# Patient Record
Sex: FEMALE | Race: WHITE | NOT HISPANIC OR LATINO | Employment: OTHER | URBAN - METROPOLITAN AREA
[De-identification: names, ages, dates, MRNs, and addresses within clinical notes are randomized per-mention and may not be internally consistent; named-entity substitution may affect disease eponyms.]

---

## 2022-07-27 DIAGNOSIS — M47.896 OTHER OSTEOARTHRITIS OF SPINE, LUMBAR REGION: Primary | ICD-10-CM

## 2022-08-18 DIAGNOSIS — M81.0 OSTEOPOROSIS: Primary | ICD-10-CM

## 2022-08-25 ENCOUNTER — OFFICE VISIT (OUTPATIENT)
Dept: PODIATRY | Facility: CLINIC | Age: 77
End: 2022-08-25
Payer: MEDICARE

## 2022-08-25 VITALS — WEIGHT: 179.19 LBS | HEIGHT: 64 IN | BODY MASS INDEX: 30.59 KG/M2

## 2022-08-25 DIAGNOSIS — I73.9 PERIPHERAL VASCULAR DISEASE: ICD-10-CM

## 2022-08-25 DIAGNOSIS — M79.676 PAIN DUE TO ONYCHOMYCOSIS OF TOENAIL: ICD-10-CM

## 2022-08-25 DIAGNOSIS — B35.1 PAIN DUE TO ONYCHOMYCOSIS OF TOENAIL: ICD-10-CM

## 2022-08-25 DIAGNOSIS — R60.0 LEG EDEMA: ICD-10-CM

## 2022-08-25 DIAGNOSIS — B35.1 TINEA UNGUIUM: ICD-10-CM

## 2022-08-25 DIAGNOSIS — L97.509 ULCER OF TOE, UNSPECIFIED LATERALITY, UNSPECIFIED ULCER STAGE: ICD-10-CM

## 2022-08-25 DIAGNOSIS — E11.49 TYPE II DIABETES MELLITUS WITH NEUROLOGICAL MANIFESTATIONS: Primary | ICD-10-CM

## 2022-08-25 PROCEDURE — 99204 PR OFFICE/OUTPT VISIT, NEW, LEVL IV, 45-59 MIN: ICD-10-PCS | Mod: 57,25,S$PBB, | Performed by: PODIATRIST

## 2022-08-25 PROCEDURE — 99999 PR PBB SHADOW E&M-EST. PATIENT-LVL IV: CPT | Mod: PBBFAC,,, | Performed by: PODIATRIST

## 2022-08-25 PROCEDURE — 11721 DEBRIDE NAIL 6 OR MORE: CPT | Performed by: PODIATRIST

## 2022-08-25 PROCEDURE — 11042 DBRDMT SUBQ TIS 1ST 20SQCM/<: CPT | Mod: PBBFAC,PN | Performed by: PODIATRIST

## 2022-08-25 PROCEDURE — 99214 OFFICE O/P EST MOD 30 MIN: CPT | Mod: PBBFAC,PN | Performed by: PODIATRIST

## 2022-08-25 PROCEDURE — 28010 INCISION OF TOE TENDON: CPT | Mod: T7,S$PBB,, | Performed by: PODIATRIST

## 2022-08-25 PROCEDURE — 28010 INCISION OF TOE TENDON: CPT | Mod: T7,PBBFAC,PN | Performed by: PODIATRIST

## 2022-08-25 PROCEDURE — 99999 PR PBB SHADOW E&M-EST. PATIENT-LVL IV: ICD-10-PCS | Mod: PBBFAC,,, | Performed by: PODIATRIST

## 2022-08-25 PROCEDURE — 99204 OFFICE O/P NEW MOD 45 MIN: CPT | Mod: 57,25,S$PBB, | Performed by: PODIATRIST

## 2022-08-25 PROCEDURE — 11721 PR DEBRIDEMENT OF NAILS, 6 OR MORE: ICD-10-PCS | Mod: 59,Q8,S$PBB, | Performed by: PODIATRIST

## 2022-08-25 PROCEDURE — 11721 DEBRIDE NAIL 6 OR MORE: CPT | Mod: 59,Q8,S$PBB, | Performed by: PODIATRIST

## 2022-08-25 PROCEDURE — 28010 PR INCISION SUBCUT TOE TENDON: ICD-10-PCS | Mod: T7,S$PBB,, | Performed by: PODIATRIST

## 2022-08-25 RX ORDER — LOSARTAN POTASSIUM 100 MG/1
100 TABLET ORAL DAILY
COMMUNITY

## 2022-08-25 RX ORDER — FUROSEMIDE 20 MG/1
20 TABLET ORAL 2 TIMES DAILY
COMMUNITY

## 2022-08-25 RX ORDER — LEVOTHYROXINE SODIUM 75 UG/1
75 TABLET ORAL
COMMUNITY

## 2022-08-25 RX ORDER — AMLODIPINE BESYLATE 10 MG/1
10 TABLET ORAL DAILY
COMMUNITY

## 2022-08-25 RX ORDER — METFORMIN HYDROCHLORIDE 1000 MG/1
1000 TABLET ORAL 2 TIMES DAILY WITH MEALS
COMMUNITY

## 2022-08-25 RX ORDER — CLONIDINE HYDROCHLORIDE 0.2 MG/1
0.2 TABLET ORAL 2 TIMES DAILY
COMMUNITY

## 2022-08-25 RX ORDER — ROSUVASTATIN CALCIUM 10 MG/1
10 TABLET, COATED ORAL DAILY
COMMUNITY

## 2022-08-25 RX ORDER — METOPROLOL SUCCINATE 100 MG/1
100 TABLET, EXTENDED RELEASE ORAL DAILY
COMMUNITY

## 2022-08-25 RX ORDER — INSULIN ASPART 100 [IU]/ML
INJECTION, SOLUTION INTRAVENOUS; SUBCUTANEOUS
COMMUNITY

## 2022-08-25 RX ORDER — TRAMADOL HYDROCHLORIDE 100 MG/1
100 TABLET, EXTENDED RELEASE ORAL DAILY
COMMUNITY

## 2022-08-25 RX ORDER — ASPIRIN 81 MG/1
81 TABLET ORAL DAILY
COMMUNITY

## 2022-08-25 NOTE — PROGRESS NOTES
Subjective:      Patient ID: Kelly Germain is a 77 y.o. female.    Chief Complaint: Diabetes Mellitus and Diabetic Foot Exam (Patient presents today as a new diabetic patient for her diabetic foot exam. /Patient stated she last seen her primary care doctor was 6/2022. )      Kelly is a 77 y.o. female who presents to the clinic upon referral from Dr. Schafer  for evaluation and treatment of diabetic feet.  Presenting with her daughter. Tells me her glucose is well controlled. Walking with RW. Complains of painful, elongated toenails times 10.  Patient with complaints of right 3rd toe ulcer/pain.  Has been dealing with callus on R 3rd toe for couple months. Glucose usually runs between . Recently moved to live with her daughter. Chronic swelling on b/l LE.    PCP: Carlos Marin MD    Date Last Seen by PCP: in epic     No results found for: HGBA1C      Review of Systems   Constitutional: Negative for chills, decreased appetite, fever and malaise/fatigue.   HENT: Negative for congestion, ear discharge and sore throat.    Eyes: Negative for discharge and pain.   Cardiovascular: Positive for leg swelling. Negative for chest pain and claudication.   Respiratory: Negative for cough and shortness of breath.    Skin: Positive for color change and poor wound healing. Negative for nail changes and rash.   Musculoskeletal: Positive for arthritis, joint swelling, muscle weakness and stiffness. Negative for joint pain.   Gastrointestinal: Negative for bloating, abdominal pain, diarrhea, nausea and vomiting.   Genitourinary: Negative for flank pain and hematuria.   Neurological: Positive for numbness. Negative for headaches and weakness.   Psychiatric/Behavioral: Negative for altered mental status.             No past medical history on file.    No past surgical history on file.    No family history on file.    Social History     Socioeconomic History    Marital status:    Tobacco Use    Smoking status: Never  "Smoker    Smokeless tobacco: Never Used       Current Outpatient Medications   Medication Sig Dispense Refill    amLODIPine (NORVASC) 10 MG tablet Take 10 mg by mouth once daily.      aspirin (ECOTRIN) 81 MG EC tablet Take 81 mg by mouth once daily.      cloNIDine (CATAPRES) 0.2 MG tablet Take 0.2 mg by mouth 2 (two) times daily.      furosemide (LASIX) 20 MG tablet Take 20 mg by mouth 2 (two) times daily.      insulin aspart U-100 (NOVOLOG) 100 unit/mL injection Inject into the skin 3 (three) times daily before meals.      insulin detemir U-100 (LEVEMIR) 100 unit/mL injection Inject into the skin every evening.      levothyroxine (SYNTHROID) 75 MCG tablet Take 75 mcg by mouth before breakfast.      losartan (COZAAR) 100 MG tablet Take 100 mg by mouth once daily.      magnesium hydroxide (IBARRA CHEWS) 311 MG Chew Take 311 mg by mouth every 4 (four) hours as needed.      metFORMIN (GLUCOPHAGE) 1000 MG tablet Take 1,000 mg by mouth 2 (two) times daily with meals.      metoprolol succinate (TOPROL-XL) 100 MG 24 hr tablet Take 100 mg by mouth once daily.      rosuvastatin (CRESTOR) 10 MG tablet Take 10 mg by mouth once daily.      traMADoL (ULTRAM-ER) 100 MG Tb24 Take 100 mg by mouth once daily.       No current facility-administered medications for this visit.       Review of patient's allergies indicates:   Allergen Reactions    Amitriptyline     Percocet [oxycodone-acetaminophen]        Vitals:    08/25/22 0852   Weight: 81.3 kg (179 lb 3.2 oz)   Height: 5' 4" (1.626 m)   PainSc:   4       Objective:      Physical Exam  Constitutional:       General: She is not in acute distress.     Appearance: She is well-developed.   HENT:      Nose: Nose normal.   Eyes:      Conjunctiva/sclera: Conjunctivae normal.   Pulmonary:      Effort: Pulmonary effort is normal.   Chest:      Chest wall: No tenderness.   Abdominal:      Tenderness: There is no abdominal tenderness.   Musculoskeletal:      Cervical back: " Normal range of motion.   Neurological:      Mental Status: She is alert and oriented to person, place, and time.   Psychiatric:         Behavior: Behavior normal.         Vascular: Distal DP/PT pulses palpable 0/4. No vericosities noted to LEs. warm to touch LE, + edema noted to LE.    Dermatologic: Toenails 1-5 bilaterally are elongated by 2-3 mm, thickened by 2-3 mm, discolored/yellowed, dystrophic, brittle with subungual debris. No incurvation.   R foot: ulcer noted distal tip of 3rd toe. No probe to bone. No rubor, no erythema, -sinus tract, + undermining. 1cm x 1cm ulcer noted distal tip of 3rd toe.     Musculoskeletal: No calf tenderness LE, Compartments soft/compressible. ROM of ankles with < 10 deg DF is noted b/l.  R foot: semi rigid hammering of lesser toes.      Neurological: Light touch, proprioception, and sharp/dull sensation are all intact. Protective threshold with the [place]-Wienstein monofilament is intact. Vibratory sensation intact.         Assessment:       Encounter Diagnoses   Name Primary?    Type II diabetes mellitus with neurological manifestations Yes    Ulcer of toe, unspecified laterality, unspecified ulcer stage - Right Foot     Leg edema     Pain due to onychomycosis of toenail     Tinea unguium     Peripheral vascular disease          Plan:       Kelly was seen today for diabetes mellitus and diabetic foot exam.    Diagnoses and all orders for this visit:    Type II diabetes mellitus with neurological manifestations  -     DIABETIC SHOES FOR HOME USE    Ulcer of toe, unspecified laterality, unspecified ulcer stage - Right Foot    Leg edema    Pain due to onychomycosis of toenail    Tinea unguium    Peripheral vascular disease      I counseled the patient on her conditions, their implications and medical management.    77 y.o. female with diabetic foot risk assessment, right 3rd toe ulcer, bilateral lower extremity edema, painful onychomycosis.     - toenails x 10 sharply  debrided with nail nipper. Tolerated well.   -s/p R 3rd toe wound debridement. See procedure note.  -s/p R 3rd toe flexor tendon release in the office today. Injected 3 cc of 0.5% marcaine plain over 3rd toe. I used #15 blade and released flexor tendon percutaneously. I prepped the skin with betadine before the procedure. Tendon was released. Irrigated with betadine again and 2-0 nylon was used to close skin. Dressed with betadine with DSD. WBAT in sx shoe. Sx shoe dispensed.     -Advised for optimal glucose control and maintenance per primary care physician. Patient was also educated on healthy diet that is naturally rich in nutrients and low in fat and calories.   -The nature of the condition, options for management, as well as potential risks and complications were discussed in detail with patient. Patient was amenable to my recommendations and left my office fully informed and will follow up as instructed or sooner if necessary.    -Patient was advised of signs and symptoms of infection including redness, drainage, purulence, odor, streaking, fever, chills and I advised patient to seek medical attention (ER or urgent care) if these symptoms arise.   -f/u 2 wks      Note dictated with voice recognition software, please excuse any grammatical errors.

## 2022-09-09 ENCOUNTER — OFFICE VISIT (OUTPATIENT)
Dept: PODIATRY | Facility: CLINIC | Age: 77
End: 2022-09-09
Payer: MEDICARE

## 2022-09-09 VITALS
DIASTOLIC BLOOD PRESSURE: 68 MMHG | HEART RATE: 56 BPM | WEIGHT: 180.63 LBS | HEIGHT: 64 IN | BODY MASS INDEX: 30.84 KG/M2 | SYSTOLIC BLOOD PRESSURE: 143 MMHG

## 2022-09-09 DIAGNOSIS — B35.1 TINEA UNGUIUM: ICD-10-CM

## 2022-09-09 DIAGNOSIS — E11.49 TYPE II DIABETES MELLITUS WITH NEUROLOGICAL MANIFESTATIONS: Primary | ICD-10-CM

## 2022-09-09 DIAGNOSIS — I73.9 PERIPHERAL VASCULAR DISEASE: ICD-10-CM

## 2022-09-09 DIAGNOSIS — B35.1 PAIN DUE TO ONYCHOMYCOSIS OF TOENAIL: ICD-10-CM

## 2022-09-09 DIAGNOSIS — M79.676 PAIN DUE TO ONYCHOMYCOSIS OF TOENAIL: ICD-10-CM

## 2022-09-09 PROCEDURE — 99213 PR OFFICE/OUTPT VISIT, EST, LEVL III, 20-29 MIN: ICD-10-PCS | Mod: 25,S$PBB,, | Performed by: PODIATRIST

## 2022-09-09 PROCEDURE — 11721 PR DEBRIDEMENT OF NAILS, 6 OR MORE: ICD-10-PCS | Mod: Q8,S$PBB,, | Performed by: PODIATRIST

## 2022-09-09 PROCEDURE — 99999 PR PBB SHADOW E&M-EST. PATIENT-LVL IV: ICD-10-PCS | Mod: PBBFAC,,, | Performed by: PODIATRIST

## 2022-09-09 PROCEDURE — 99999 PR PBB SHADOW E&M-EST. PATIENT-LVL IV: CPT | Mod: PBBFAC,,, | Performed by: PODIATRIST

## 2022-09-09 PROCEDURE — 99213 OFFICE O/P EST LOW 20 MIN: CPT | Mod: 25,S$PBB,, | Performed by: PODIATRIST

## 2022-09-09 PROCEDURE — 11721 DEBRIDE NAIL 6 OR MORE: CPT | Mod: Q8,S$PBB,, | Performed by: PODIATRIST

## 2022-09-09 PROCEDURE — 11721 DEBRIDE NAIL 6 OR MORE: CPT | Mod: Q8,PBBFAC,PN | Performed by: PODIATRIST

## 2022-09-09 PROCEDURE — 99214 OFFICE O/P EST MOD 30 MIN: CPT | Mod: PBBFAC,PN | Performed by: PODIATRIST

## 2022-09-09 RX ORDER — CICLOPIROX 80 MG/ML
SOLUTION TOPICAL NIGHTLY
Qty: 6.6 ML | Refills: 3 | Status: SHIPPED | OUTPATIENT
Start: 2022-09-09

## 2022-09-09 NOTE — PROGRESS NOTES
Subjective:      Patient ID: Kelly Germain is a 77 y.o. female.    Chief Complaint: Follow-up (Patient presents today for a follow up for 3rd toe right foot. )      Kelly is a 77 y.o. female who presents to the clinic upon referral from Dr. Zelaya ref. provider found  for evaluation and treatment of diabetic feet.  Presenting with her daughter. Tells me her glucose is well controlled. Walking with RW. Complains of painful, elongated toenails times 10.  Patient with complaints of right 3rd toe ulcer/pain.  Has been dealing with callus on R 3rd toe for couple months. Glucose usually runs between . Recently moved to live with her daughter. Chronic swelling on b/l LE.    9/9/22 status post right 3rd flexor tenotomy in the office last time.  Patient ambulating in surgical shoe.  The patient is presenting with her daughter.  Denies pain.  Complains of long and thickened and painful toenails times 10.  Right 3rd toe ulcer healed well the flexor tenotomy.    PCP: Carlos Marin MD    Date Last Seen by PCP: in epic     No results found for: HGBA1C      Review of Systems   Constitutional: Negative for chills, decreased appetite, fever and malaise/fatigue.   HENT:  Negative for congestion, ear discharge and sore throat.    Eyes:  Negative for discharge and pain.   Cardiovascular:  Positive for leg swelling. Negative for chest pain and claudication.   Respiratory:  Negative for cough and shortness of breath.    Skin:  Positive for color change. Negative for nail changes, poor wound healing and rash.   Musculoskeletal:  Positive for arthritis, joint swelling, muscle weakness and stiffness. Negative for joint pain.   Gastrointestinal:  Negative for bloating, abdominal pain, diarrhea, nausea and vomiting.   Genitourinary:  Negative for flank pain and hematuria.   Neurological:  Positive for numbness. Negative for headaches and weakness.   Psychiatric/Behavioral:  Negative for altered mental status.            No past  "medical history on file.    No past surgical history on file.    No family history on file.    Social History     Socioeconomic History    Marital status:    Tobacco Use    Smoking status: Never    Smokeless tobacco: Never       Current Outpatient Medications   Medication Sig Dispense Refill    amLODIPine (NORVASC) 10 MG tablet Take 10 mg by mouth once daily.      aspirin (ECOTRIN) 81 MG EC tablet Take 81 mg by mouth once daily.      cloNIDine (CATAPRES) 0.2 MG tablet Take 0.2 mg by mouth 2 (two) times daily.      furosemide (LASIX) 20 MG tablet Take 20 mg by mouth 2 (two) times daily.      insulin aspart U-100 (NOVOLOG) 100 unit/mL injection Inject into the skin 3 (three) times daily before meals.      insulin detemir U-100 (LEVEMIR) 100 unit/mL injection Inject into the skin every evening.      levothyroxine (SYNTHROID) 75 MCG tablet Take 75 mcg by mouth before breakfast.      losartan (COZAAR) 100 MG tablet Take 100 mg by mouth once daily.      magnesium hydroxide (IBARRA CHEWS) 311 MG Chew Take 311 mg by mouth every 4 (four) hours as needed.      metFORMIN (GLUCOPHAGE) 1000 MG tablet Take 1,000 mg by mouth 2 (two) times daily with meals.      metoprolol succinate (TOPROL-XL) 100 MG 24 hr tablet Take 100 mg by mouth once daily.      rosuvastatin (CRESTOR) 10 MG tablet Take 10 mg by mouth once daily.      traMADoL (ULTRAM-ER) 100 MG Tb24 Take 100 mg by mouth once daily.      ciclopirox (PENLAC) 8 % Soln Apply topically nightly. 6.6 mL 3     No current facility-administered medications for this visit.       Review of patient's allergies indicates:   Allergen Reactions    Amitriptyline     Percocet [oxycodone-acetaminophen]        Vitals:    09/09/22 0942   BP: (!) 143/68   Pulse: (!) 56   Weight: 81.9 kg (180 lb 9.6 oz)   Height: 5' 4" (1.626 m)   PainSc: 0-No pain         Objective:      Physical Exam  Constitutional:       General: She is not in acute distress.     Appearance: She is well-developed. "   HENT:      Nose: Nose normal.   Eyes:      Conjunctiva/sclera: Conjunctivae normal.   Pulmonary:      Effort: Pulmonary effort is normal.   Chest:      Chest wall: No tenderness.   Abdominal:      Tenderness: There is no abdominal tenderness.   Musculoskeletal:      Cervical back: Normal range of motion.   Neurological:      Mental Status: She is alert and oriented to person, place, and time.   Psychiatric:         Behavior: Behavior normal.       Vascular: Distal DP/PT pulses palpable 0/4. No vericosities noted to LEs. warm to touch LE, + edema noted to LE.    Dermatologic: Toenails 1-5 bilaterally are elongated by 2-3 mm, thickened by 2-3 mm, discolored/yellowed, dystrophic, brittle with subungual debris. No incurvation.   R foot:  Healed distal tip of the 3rd toe.  Mild hyperkeratosis noted at distal tip of 3rd toe.     Musculoskeletal: No calf tenderness LE, Compartments soft/compressible. ROM of ankles with < 10 deg DF is noted b/l.    Neurological: Light touch, proprioception, and sharp/dull sensation are all intact. Protective threshold with the [place]-Wienstein monofilament is intact. Vibratory sensation intact.         Assessment:       Encounter Diagnoses   Name Primary?    Type II diabetes mellitus with neurological manifestations Yes    Pain due to onychomycosis of toenail     Tinea unguium     Peripheral vascular disease            Plan:       Kelly was seen today for follow-up.    Diagnoses and all orders for this visit:    Type II diabetes mellitus with neurological manifestations  -     DIABETIC SHOES FOR HOME USE    Pain due to onychomycosis of toenail    Tinea unguium    Peripheral vascular disease    Other orders  -     ciclopirox (PENLAC) 8 % Soln; Apply topically nightly.      I counseled the patient on her conditions, their implications and medical management.    77 y.o. female with diabetic foot risk assessment, right 3rd toe ulcer, bilateral lower extremity edema, painful onychomycosis  status post flexor tenotomy in the office.     -Rx diabetic shoe  -Rx Penlac  - toenails x 10 sharply debrided with nail nipper. Tolerated well.     -Shoe inspection. General Foot Education. Patient reminded of the importance of good nutrition. Patient instructed on proper foot hygeine. We discussed wearing proper shoe gear, daily foot inspections, never walking without protective shoe gear, caution putting sharp instruments to feet. Discussed general foot care:  Wear comfortable, proper fitting shoes. Wash feet daily. Dry well. After drying, apply moisturizer to feet (no lotion to webspaces). Inspect feet daily for skin breaks, blisters, swelling, or redness. Wear cotton socks (preferably white)  Change socks every day. Do NOT walk barefoot. Do NOT use heating pads or warm/hot water soaks   -Advised for optimal glucose control and maintenance per primary care physician. Patient was also educated on healthy diet that is naturally rich in nutrients and low in fat and calories.   -The nature of the condition, options for management, as well as potential risks and complications were discussed in detail with patient. Patient was amenable to my recommendations and left my office fully informed and will follow up as instructed or sooner if necessary.    -Patient was advised of signs and symptoms of infection including redness, drainage, purulence, odor, streaking, fever, chills and I advised patient to seek medical attention (ER or urgent care) if these symptoms arise.   -f/u 6 months     Note dictated with voice recognition software, please excuse any grammatical errors.

## 2022-09-20 ENCOUNTER — TELEPHONE (OUTPATIENT)
Dept: PODIATRY | Facility: CLINIC | Age: 77
End: 2022-09-20
Payer: MEDICARE

## 2022-09-20 NOTE — TELEPHONE ENCOUNTER
----- Message from Sherice Caraballo DPM sent at 9/20/2022  4:04 PM CDT -----  Regarding: RE: Call back  Contact: Tegan talbot/ [facility]  I called and was on hold for 20 min.  Can you call back to number and find out what they need?    Thanks    ----- Message -----  From: Amborse Mcknight MA  Sent: 9/20/2022   9:51 AM CDT  To: Sherice Caraballo DPM  Subject: FW: Call back                                    Please give a call to number below.  ----- Message -----  From: Janee Lombardo  Sent: 9/20/2022   8:04 AM CDT  To: [facility]ly[facility] Staff  Subject: Call back                                        Tegan talbot/ [facility] requesting to speak w/  in regards to  a Formulary  Acception Request For the Pt    Please call and Advise    Phone [phone]-

## 2022-09-20 NOTE — TELEPHONE ENCOUNTER
Returned call to patient. Spoke to patient daughter, Daughter stated no one called and she's her mom care taker.